# Patient Record
Sex: FEMALE | Race: WHITE
[De-identification: names, ages, dates, MRNs, and addresses within clinical notes are randomized per-mention and may not be internally consistent; named-entity substitution may affect disease eponyms.]

---

## 2020-10-10 ENCOUNTER — HOSPITAL ENCOUNTER (EMERGENCY)
Dept: HOSPITAL 62 - ER | Age: 45
Discharge: LEFT BEFORE DISCHARGE | End: 2020-10-10
Payer: COMMERCIAL

## 2020-10-10 VITALS — DIASTOLIC BLOOD PRESSURE: 75 MMHG | SYSTOLIC BLOOD PRESSURE: 105 MMHG

## 2020-10-10 DIAGNOSIS — R10.10: ICD-10-CM

## 2020-10-10 DIAGNOSIS — K80.20: Primary | ICD-10-CM

## 2020-10-10 DIAGNOSIS — E80.6: ICD-10-CM

## 2020-10-10 DIAGNOSIS — R11.2: ICD-10-CM

## 2020-10-10 DIAGNOSIS — R74.8: ICD-10-CM

## 2020-10-10 LAB
ADD MANUAL DIFF: NO
ADD MANUAL DIFF: NO
ALBUMIN SERPL-MCNC: 3.1 G/DL (ref 3.5–5)
ALBUMIN SERPL-MCNC: 4.3 G/DL (ref 3.5–5)
ALP SERPL-CCNC: 124 U/L (ref 38–126)
ALP SERPL-CCNC: 94 U/L (ref 38–126)
ANION GAP SERPL CALC-SCNC: 7 MMOL/L (ref 5–19)
ANION GAP SERPL CALC-SCNC: 9 MMOL/L (ref 5–19)
APPEARANCE UR: (no result)
APTT PPP: (no result) S
AST SERPL-CCNC: 166 U/L (ref 14–36)
AST SERPL-CCNC: 241 U/L (ref 14–36)
BASOPHILS # BLD AUTO: 0 10^3/UL (ref 0–0.2)
BASOPHILS # BLD AUTO: 0 10^3/UL (ref 0–0.2)
BASOPHILS NFR BLD AUTO: 0.2 % (ref 0–2)
BASOPHILS NFR BLD AUTO: 0.5 % (ref 0–2)
BILIRUB DIRECT SERPL-MCNC: 2.3 MG/DL (ref 0–0.4)
BILIRUB DIRECT SERPL-MCNC: 2.6 MG/DL (ref 0–0.4)
BILIRUB SERPL-MCNC: 3.3 MG/DL (ref 0.2–1.3)
BILIRUB SERPL-MCNC: 3.9 MG/DL (ref 0.2–1.3)
BILIRUB UR QL STRIP: (no result)
BUN SERPL-MCNC: 11 MG/DL (ref 7–20)
BUN SERPL-MCNC: 13 MG/DL (ref 7–20)
CALCIUM: 7.5 MG/DL (ref 8.4–10.2)
CALCIUM: 9.2 MG/DL (ref 8.4–10.2)
CHLORIDE SERPL-SCNC: 101 MMOL/L (ref 98–107)
CHLORIDE SERPL-SCNC: 109 MMOL/L (ref 98–107)
CO2 SERPL-SCNC: 23 MMOL/L (ref 22–30)
CO2 SERPL-SCNC: 31 MMOL/L (ref 22–30)
EOSINOPHIL # BLD AUTO: 0.2 10^3/UL (ref 0–0.6)
EOSINOPHIL # BLD AUTO: 0.3 10^3/UL (ref 0–0.6)
EOSINOPHIL NFR BLD AUTO: 3.4 % (ref 0–6)
EOSINOPHIL NFR BLD AUTO: 4.2 % (ref 0–6)
ERYTHROCYTE [DISTWIDTH] IN BLOOD BY AUTOMATED COUNT: 13.8 % (ref 11.5–14)
ERYTHROCYTE [DISTWIDTH] IN BLOOD BY AUTOMATED COUNT: 14 % (ref 11.5–14)
GLUCOSE SERPL-MCNC: 106 MG/DL (ref 75–110)
GLUCOSE SERPL-MCNC: 71 MG/DL (ref 75–110)
GLUCOSE UR STRIP-MCNC: NEGATIVE MG/DL
HCT VFR BLD CALC: 34.3 % (ref 36–47)
HCT VFR BLD CALC: 42.4 % (ref 36–47)
HGB BLD-MCNC: 11.9 G/DL (ref 12–15.5)
HGB BLD-MCNC: 14.4 G/DL (ref 12–15.5)
KETONES UR STRIP-MCNC: NEGATIVE MG/DL
LYMPHOCYTES # BLD AUTO: 1.1 10^3/UL (ref 0.5–4.7)
LYMPHOCYTES # BLD AUTO: 1.2 10^3/UL (ref 0.5–4.7)
LYMPHOCYTES NFR BLD AUTO: 12.9 % (ref 13–45)
LYMPHOCYTES NFR BLD AUTO: 20.1 % (ref 13–45)
MCH RBC QN AUTO: 29 PG (ref 27–33.4)
MCH RBC QN AUTO: 29.5 PG (ref 27–33.4)
MCHC RBC AUTO-ENTMCNC: 34 G/DL (ref 32–36)
MCHC RBC AUTO-ENTMCNC: 34.6 G/DL (ref 32–36)
MCV RBC AUTO: 85 FL (ref 80–97)
MCV RBC AUTO: 85 FL (ref 80–97)
MONOCYTES # BLD AUTO: 0.4 10^3/UL (ref 0.1–1.4)
MONOCYTES # BLD AUTO: 0.4 10^3/UL (ref 0.1–1.4)
MONOCYTES NFR BLD AUTO: 5.4 % (ref 3–13)
MONOCYTES NFR BLD AUTO: 6.7 % (ref 3–13)
NEUTROPHILS # BLD AUTO: 4.1 10^3/UL (ref 1.7–8.2)
NEUTROPHILS # BLD AUTO: 6.3 10^3/UL (ref 1.7–8.2)
NEUTS SEG NFR BLD AUTO: 69.6 % (ref 42–78)
NEUTS SEG NFR BLD AUTO: 77 % (ref 42–78)
NITRITE UR QL STRIP: NEGATIVE
PH UR STRIP: 6 [PH] (ref 5–9)
PLATELET # BLD: 271 10^3/UL (ref 150–450)
PLATELET # BLD: 407 10^3/UL (ref 150–450)
POTASSIUM SERPL-SCNC: 4 MMOL/L (ref 3.6–5)
POTASSIUM SERPL-SCNC: 4.2 MMOL/L (ref 3.6–5)
PROT SERPL-MCNC: 5.6 G/DL (ref 6.3–8.2)
PROT SERPL-MCNC: 7.1 G/DL (ref 6.3–8.2)
PROT UR STRIP-MCNC: 30 MG/DL
RBC # BLD AUTO: 4.04 10^6/UL (ref 3.72–5.28)
RBC # BLD AUTO: 4.97 10^6/UL (ref 3.72–5.28)
SP GR UR STRIP: 1.02
TOTAL CELLS COUNTED % (AUTO): 100 %
TOTAL CELLS COUNTED % (AUTO): 100 %
UROBILINOGEN UR-MCNC: 4 MG/DL (ref ?–2)
WBC # BLD AUTO: 5.9 10^3/UL (ref 4–10.5)
WBC # BLD AUTO: 8.2 10^3/UL (ref 4–10.5)

## 2020-10-10 PROCEDURE — 36415 COLL VENOUS BLD VENIPUNCTURE: CPT

## 2020-10-10 PROCEDURE — 81001 URINALYSIS AUTO W/SCOPE: CPT

## 2020-10-10 PROCEDURE — 96374 THER/PROPH/DIAG INJ IV PUSH: CPT

## 2020-10-10 PROCEDURE — 82270 OCCULT BLOOD FECES: CPT

## 2020-10-10 PROCEDURE — 93010 ELECTROCARDIOGRAM REPORT: CPT

## 2020-10-10 PROCEDURE — 85025 COMPLETE CBC W/AUTO DIFF WBC: CPT

## 2020-10-10 PROCEDURE — 89055 LEUKOCYTE ASSESSMENT FECAL: CPT

## 2020-10-10 PROCEDURE — 99285 EMERGENCY DEPT VISIT HI MDM: CPT

## 2020-10-10 PROCEDURE — 74177 CT ABD & PELVIS W/CONTRAST: CPT

## 2020-10-10 PROCEDURE — 76700 US EXAM ABDOM COMPLETE: CPT

## 2020-10-10 PROCEDURE — 87205 SMEAR GRAM STAIN: CPT

## 2020-10-10 PROCEDURE — 93005 ELECTROCARDIOGRAM TRACING: CPT

## 2020-10-10 PROCEDURE — 80074 ACUTE HEPATITIS PANEL: CPT

## 2020-10-10 PROCEDURE — 81025 URINE PREGNANCY TEST: CPT

## 2020-10-10 PROCEDURE — 87070 CULTURE OTHR SPECIMN AEROBIC: CPT

## 2020-10-10 PROCEDURE — 96361 HYDRATE IV INFUSION ADD-ON: CPT

## 2020-10-10 PROCEDURE — 80053 COMPREHEN METABOLIC PANEL: CPT

## 2020-10-10 PROCEDURE — 83690 ASSAY OF LIPASE: CPT

## 2020-10-10 PROCEDURE — 87045 FECES CULTURE AEROBIC BACT: CPT

## 2020-10-10 NOTE — ER DOCUMENT REPORT
ED General





- General


Stated Complaint: NAUSEA,VOMITING


Time Seen by Provider: 10/10/20 12:48





- HPI


Notes: 





45-year-old female with a history of 4 C-sections presents to the emergency room

today with complaints of intermittent abdominal pain and vomiting since March 

but has become progressively worse over the last 3 days.  She reports anytime 

she eats anything she starts vomiting, having abdominal pain. Since march, pt 

reports that she has had issues with nausea and vomiting, she does report that 

her pain is worse after eating.  Patient states that she is also having looser 

stool, denies any new medications foods or travel.  She is concerned that she 

may have appendicitis because her mother told her her symptoms sound like 

appendicitis reports   Patient has not tried any over-the-counter medications.  

Reports pain is 4 out of 5.  Denies any history of GI bleeds, she is unsure 

about melena.  Denies any fevers or chills.  Denies any history of GERD, for 

colonoscopy.  Patient has not tried any over-the-counter medications to help 

with her vomiting.





MEDICATIONS: I agree with the patient medications as charted by the RN.





ALLERGIES: I agree with the allergies as charted by the RN.





PAST MEDICAL HISTORY/PAST SURGICAL HISTORY: Reviewed and agree as charted by RN.





SOCIAL HISTORY: Reviewed and agree as charted by RN.





FAMILY HISTORY: No significant familial comorbid conditions directly related to 

patient complaint





EXAM:


Reviewed vital signs as charted by RN.





REVIEW OF SYSTEMS:reviewed vital signs by RN


CONSTITUTIONAL :  Denies fever,  chills, or sweats.  Denies recent illness.


EENT:   Denies eye, ear, throat, or mouth pain or symptoms.  Denies nasal or 

sinus congestion or discharge.  Denies throat, tongue, or mouth swelling or 

difficulty swallowing.


CARDIOVASCULAR:  Denies chest pain.  Denies palpitations or racing or irregular 

heart beat.  Denies ankle edema.


RESPIRATORY:  Denies cough, cold, or chest congestion.  Denies shortness of 

breath, difficulty breathing, or wheezing.


GASTROINTESTINAL: Reports upper abdominal pain. denies abdominal pain or 

distention.  reports nausea. denies vomiting, or diarrhea.  Denies blood in v

omitus, stools, or per rectum.  Denies black, tarry stools.  Denies 

constipation. 


GENITOURINARY:  Denies difficulty urinating, painful urination, burning, 

frequency, blood in urine, or discharge.


FEMALE  GENITOURINARY:  Denies vaginal bleeding, heavy or abnormal periods, 

irregular periods.  Denies vaginal discharge or odor. 


MUSCULOSKELETAL:  Denies back or neck pain or stiffness.  Denies joint pain or 

swelling.


SKIN:   Denies rash, lesions or sores.


HEMATOLOGIC :   Denies easy bruising or bleeding.


LYMPHATIC:  Denies swollen, enlarged glands.


NEUROLOGICAL:  Denies confusion or altered mental status.  Denies passing out or

loss of consciousness.  Denies dizziness or lightheadedness.  Denies headache.  

Denies weakness or paralysis or loss of use of either side.  Denies problems 

with gait or speech.  Denies sensory loss, numbness, or tingling.  Denies 

seizures.


PSYCHIATRIC:  Denies anxiety or stress.  Denies depression, suicidal ideation, 

or homicidal ideation.





ALL OTHER SYSTEMS REVIEWED AND NEGATIVE.











PHYSICAL EXAMINATION:





GENERAL: Well-appearing, well-nourished and in no acute distress.





HEAD: Atraumatic, normocephalic.





EYES: Pupils equal round and reactive to light, extraocular movements intact, 

conjunctiva are normal.





ENT: Nares patent, oropharynx clear without exudates.  Moist mucous membranes.





NECK: Normal range of motion, supple without lymphadenopathy





LUNGS: Breath sounds clear to auscultation bilaterally and equal.  No wheezes 

rales or rhonchi.





HEART: Regular rate and rhythm without murmurs





ABDOMEN: Soft, RUQ abd pain on palpation, epigastric a nondistended abdomen.  No

guarding, no rebound.  No masses appreciated. no cva tenderness appreciated. 





Female : deferred





Musculoskeletal: Normal range of motion, no pitting or edema.  No cyanosis.





NEUROLOGICAL: Cranial nerves grossly intact.  Normal speech, normal gait.  

Normal sensory, motor exams





PSYCH: Normal mood, normal affect.





SKIN: Warm, Dry, normal turgor, no rashes or lesions noted.

















Dictation was performed using Dragon voice recognition software





- Related Data


Allergies/Adverse Reactions: 


                                        





No Known Allergies Allergy (Unverified 10/10/20 12:44)


   











Past Medical History





- General


Information source: Patient





- Social History


Smoking Status: Unknown if Ever Smoked


Family History: Reviewed & Not Pertinent





Physical Exam





- Vital signs


Vitals: 


                                        











Temp Pulse Resp BP Pulse Ox


 


 98.4 F   80   16   113/58 L  100 


 


 10/10/20 12:12  10/10/20 12:12  10/10/20 12:12  10/10/20 12:12  10/10/20 12:12














Course





- Re-evaluation


Re-evalutation: 





10/10/20 18:50


Afebrile vitals stable no distress.  CBC negative for leukocytosis.  CMP shows 

no nephrotic issues, liver enzymes are well over 3 times the normal limit as 

well as her direct bilirubin being elevated and total bilirubin being elevated. 

Urinalysis does show bilirubin as well, no leukesterase.  Patient given IV 

fluids, Zofran to help manage her nausea and.  Ultrasound of abdomen does show 

multiple gallstones, largest measures 2 cm, normal wall thickness, no 

pericholecystic fluid, CT abdomen pelvis with IV and oral contrast shows that 

she does have a normal appendix, she does show to have gallstones, no 

inflammatory changes to suggest cholelithiasis however patient is having 

tenderness in her right upper quadrant and is symptomatic.  Patient refuses to 

stay because she has to go home to be with her daughter because she is home 

alone, her ex- is coming in the morning to take the child for custody per

the court order regulations.  She states she does not any family in the area 

nobody that can watch her child.  She states that she cannot stay even though I 

discussed with her explicitly the risk of possible temporary permanent 

disability or death that she does need to be evaluated by a surgeon for a 

possible cholecystectomy.  After performing a Medical Screening Examination, I 

spoke with the patient at length in regards to leaving the hospital against 

medical advice. I do not believe the patient should leave but the patient is 

alert oriented x4, understands the risks and benefits of staying and leaving 

including disability and death. Pt understands that she can return at any time 

for further care and is more than welcome to do so.  Patient was given Dr. Bro, surgeon on call, contact information and was counseled to return 

immediately if any of her symptoms change she can bring her child if still 

needed. pt verbalizes this understanding.





- Vital Signs


Vital signs: 


                                        











Temp Pulse Resp BP Pulse Ox


 


 98.7 F   92   16   105/75   100 


 


 10/10/20 18:21  10/10/20 18:21  10/10/20 18:21  10/10/20 18:21  10/10/20 18:21














- Laboratory


Result Diagrams: 


                                 10/10/20 16:12





                                 10/10/20 16:12


Laboratory results interpreted by me: 


                                        











  10/10/20 10/10/20 10/10/20





  12:18 12:18 12:18


 


Hgb   


 


Hct   


 


Lymph % (Auto)  12.9 L  


 


Chloride   


 


Carbon Dioxide   31 H 


 


Glucose   


 


Calcium   


 


Total Bilirubin   3.9 H 


 


Direct Bilirubin   2.6 H 


 


AST   241 H 


 


ALT   592 H 


 


Total Protein   


 


Albumin   


 


Urine Protein    30 H


 


Urine Bilirubin    MODERATE H


 


Urine Urobilinogen    4.0 H














  10/10/20 10/10/20





  16:12 16:12


 


Hgb  11.9 L D 


 


Hct  34.3 L 


 


Lymph % (Auto)  


 


Chloride   109 H


 


Carbon Dioxide  


 


Glucose   71 L


 


Calcium   7.5 L


 


Total Bilirubin   3.3 H


 


Direct Bilirubin   2.3 H


 


AST   166 H


 


ALT   419 H


 


Total Protein   5.6 L


 


Albumin   3.1 L


 


Urine Protein  


 


Urine Bilirubin  


 


Urine Urobilinogen  














Discharge





- Discharge


Clinical Impression: 


 Liver enzyme elevation, Cholelithiasis, Elevated bilirubin





Condition: Stable


Disposition: AGAINST MEDICAL ADVICE


Referrals: 


CARMELINA BRO MD [ACTIVE STAFF] - Follow up tomorrow

## 2020-10-10 NOTE — RADIOLOGY REPORT (SQ)
EXAM DESCRIPTION:  U/S ABDOMEN COMPLETE W/O DOP



IMAGES COMPLETED DATE/TIME:  10/10/2020 4:52 pm



REASON FOR STUDY:  RUQ abd pain



COMPARISON:  None.



TECHNIQUE:  Dynamic and static grayscale images acquired of the abdomen and recorded on PACS. Additio
nal selected color Doppler and spectral images recorded.

Note:  Study does not meet criteria for complete doppler/duplex scan



LIMITATIONS:  None.



FINDINGS:  PANCREAS: No masses. Visualized pancreatic duct normal caliber.

LIVER: No masses. Echotexture normal.

LIVER VASCULATURE: Normal directional flow of the main portal vein and hepatic veins.

GALLBLADDER: Multiple gallstones, largest measures 2 cm.  Normal wall thickness. No pericholecystic f
luid.

ULTRASOUND-DETECTED ROBLES'S SIGN: Negative.

INTRAHEPATIC DUCTS AND COMMON DUCT: CBD and intrahepatic ducts normal caliber. No filling defects.

INFERIOR VENA CAVA: Normal flow.

AORTA: No aneurysm.

RIGHT KIDNEY:Normal size. Normal echogenicity. No solid or suspicious masses. No hydronephrosis. No c
alcifications.

LEFT KIDNEY:  Normal size. Normal echogenicity. No solid or suspicious masses. No hydronephrosis. No 
calcifications.

SPLEEN: Normal size. No solid masses.

PERITONEAL AND PLEURAL SPACES: No ascites or effusions.

OTHER: No other significant finding.



IMPRESSION:  Multiple gallstones, largest measures 2 cm. Normal wall thickness. No pericholecystic fl
uid.



TECHNICAL DOCUMENTATION:  JOB ID:  [id number]

[id number]

 2011 [employer]- All Rights Reserved



Reading location - IP/workstation name: [employer]

## 2020-10-11 ENCOUNTER — HOSPITAL ENCOUNTER (OUTPATIENT)
Dept: HOSPITAL 62 - ER | Age: 45
Setting detail: OBSERVATION
LOS: 1 days | Discharge: HOME | End: 2020-10-12
Payer: COMMERCIAL

## 2020-10-11 DIAGNOSIS — Z20.828: ICD-10-CM

## 2020-10-11 DIAGNOSIS — Z87.891: ICD-10-CM

## 2020-10-11 DIAGNOSIS — R63.0: ICD-10-CM

## 2020-10-11 DIAGNOSIS — K80.00: Primary | ICD-10-CM

## 2020-10-11 DIAGNOSIS — R11.0: ICD-10-CM

## 2020-10-11 DIAGNOSIS — R10.11: ICD-10-CM

## 2020-10-11 DIAGNOSIS — R94.5: ICD-10-CM

## 2020-10-11 LAB
ADD MANUAL DIFF: NO
ALBUMIN SERPL-MCNC: 4 G/DL (ref 3.5–5)
ALP SERPL-CCNC: 113 U/L (ref 38–126)
ANION GAP SERPL CALC-SCNC: 8 MMOL/L (ref 5–19)
APPEARANCE UR: (no result)
APTT PPP: (no result) S
AST SERPL-CCNC: 140 U/L (ref 14–36)
BASOPHILS # BLD AUTO: 0 10^3/UL (ref 0–0.2)
BASOPHILS NFR BLD AUTO: 0.6 % (ref 0–2)
BILIRUB DIRECT SERPL-MCNC: 0.9 MG/DL (ref 0–0.4)
BILIRUB SERPL-MCNC: 2 MG/DL (ref 0.2–1.3)
BILIRUB UR QL STRIP: NEGATIVE
BUN SERPL-MCNC: 8 MG/DL (ref 7–20)
CALCIUM: 9 MG/DL (ref 8.4–10.2)
CHLORIDE SERPL-SCNC: 103 MMOL/L (ref 98–107)
CO2 SERPL-SCNC: 26 MMOL/L (ref 22–30)
EOSINOPHIL # BLD AUTO: 0.3 10^3/UL (ref 0–0.6)
EOSINOPHIL NFR BLD AUTO: 4.8 % (ref 0–6)
ERYTHROCYTE [DISTWIDTH] IN BLOOD BY AUTOMATED COUNT: 14 % (ref 11.5–14)
GLUCOSE SERPL-MCNC: 81 MG/DL (ref 75–110)
GLUCOSE UR STRIP-MCNC: NEGATIVE MG/DL
HCT VFR BLD CALC: 36.7 % (ref 36–47)
HGB BLD-MCNC: 12.3 G/DL (ref 12–15.5)
KETONES UR STRIP-MCNC: 20 MG/DL
LYMPHOCYTES # BLD AUTO: 1.2 10^3/UL (ref 0.5–4.7)
LYMPHOCYTES NFR BLD AUTO: 22.6 % (ref 13–45)
MCH RBC QN AUTO: 28.6 PG (ref 27–33.4)
MCHC RBC AUTO-ENTMCNC: 33.6 G/DL (ref 32–36)
MCV RBC AUTO: 85 FL (ref 80–97)
MONOCYTES # BLD AUTO: 0.4 10^3/UL (ref 0.1–1.4)
MONOCYTES NFR BLD AUTO: 8.1 % (ref 3–13)
NEUTROPHILS # BLD AUTO: 3.3 10^3/UL (ref 1.7–8.2)
NEUTS SEG NFR BLD AUTO: 63.9 % (ref 42–78)
PH UR STRIP: 5 [PH] (ref 5–9)
PLATELET # BLD: 320 10^3/UL (ref 150–450)
POTASSIUM SERPL-SCNC: 4.1 MMOL/L (ref 3.6–5)
PROT SERPL-MCNC: 6.7 G/DL (ref 6.3–8.2)
PROT UR STRIP-MCNC: NEGATIVE MG/DL
RBC # BLD AUTO: 4.32 10^6/UL (ref 3.72–5.28)
SP GR UR STRIP: 1.02
TOTAL CELLS COUNTED % (AUTO): 100 %
UROBILINOGEN UR-MCNC: NEGATIVE MG/DL (ref ?–2)
WBC # BLD AUTO: 5.2 10^3/UL (ref 4–10.5)

## 2020-10-11 PROCEDURE — 0FT44ZZ RESECTION OF GALLBLADDER, PERCUTANEOUS ENDOSCOPIC APPROACH: ICD-10-PCS | Performed by: SURGERY

## 2020-10-11 PROCEDURE — C9803 HOPD COVID-19 SPEC COLLECT: HCPCS

## 2020-10-11 PROCEDURE — 99285 EMERGENCY DEPT VISIT HI MDM: CPT

## 2020-10-11 PROCEDURE — 85025 COMPLETE CBC W/AUTO DIFF WBC: CPT

## 2020-10-11 PROCEDURE — 96360 HYDRATION IV INFUSION INIT: CPT

## 2020-10-11 PROCEDURE — 47563 LAPARO CHOLECYSTECTOMY/GRAPH: CPT

## 2020-10-11 PROCEDURE — G0378 HOSPITAL OBSERVATION PER HR: HCPCS

## 2020-10-11 PROCEDURE — 96361 HYDRATE IV INFUSION ADD-ON: CPT

## 2020-10-11 PROCEDURE — 81025 URINE PREGNANCY TEST: CPT

## 2020-10-11 PROCEDURE — 87635 SARS-COV-2 COVID-19 AMP PRB: CPT

## 2020-10-11 PROCEDURE — 80053 COMPREHEN METABOLIC PANEL: CPT

## 2020-10-11 PROCEDURE — 00790 ANES IPER UPR ABD NOS: CPT

## 2020-10-11 PROCEDURE — 74300 X-RAY BILE DUCTS/PANCREAS: CPT

## 2020-10-11 PROCEDURE — 88304 TISSUE EXAM BY PATHOLOGIST: CPT

## 2020-10-11 PROCEDURE — 36415 COLL VENOUS BLD VENIPUNCTURE: CPT

## 2020-10-11 PROCEDURE — 81001 URINALYSIS AUTO W/SCOPE: CPT

## 2020-10-11 RX ADMIN — Medication SCH ML: at 21:10

## 2020-10-11 RX ADMIN — DOCUSATE SODIUM SCH MG: 100 CAPSULE, LIQUID FILLED ORAL at 18:17

## 2020-10-11 RX ADMIN — KETOROLAC TROMETHAMINE PRN MG: 30 INJECTION, SOLUTION INTRAMUSCULAR at 21:13

## 2020-10-11 RX ADMIN — OXYCODONE AND ACETAMINOPHEN PRN TAB: 5; 325 TABLET ORAL at 14:29

## 2020-10-11 RX ADMIN — OXYCODONE AND ACETAMINOPHEN PRN TAB: 5; 325 TABLET ORAL at 18:29

## 2020-10-11 NOTE — RADIOLOGY REPORT (SQ)
EXAM DESCRIPTION:  NO CHG FLUORO; CHOLANGIOGRAM OPERATIVE



IMAGES COMPLETED DATE/TIME:  10/11/2020 1:35 pm



REASON FOR STUDY:  LAP KEITH



COMPARISON:  None.



FLUOROSCOPY TIME:  0.1 minutes

8 images saved to PACS.



TECHNIQUE:  Cinegraphic images were obtained from an intraoperative cholangiogram.



LIMITATIONS:  None.



FINDINGS:  There is opacification of the bile ducts, cystic duct remnants and second portion of the d
uodenum without evidence of fixed filling defect or significant extravasation.



IMPRESSION:  INTRAOPERATIVE CHOLANGIOGRAM.



COMMENT:  Quality :  Final reports for procedures using fluoroscopy that document radiation exp
osure indices, or exposure time and number of fluorographic images (if radiation exposure indices are
 not available)



TECHNICAL DOCUMENTATION:  JOB ID:  [id number]

 2011 [employer]- All Rights Reserved



Reading location - IP/workstation name: [email or web address]

## 2020-10-11 NOTE — RADIOLOGY REPORT (SQ)
EXAM DESCRIPTION:  NO CHG FLUORO; CHOLANGIOGRAM OPERATIVE



IMAGES COMPLETED DATE/TIME:  10/11/2020 1:35 pm



REASON FOR STUDY:  LAP KEITH



COMPARISON:  None.



FLUOROSCOPY TIME:  0.1 minutes

8 images saved to PACS.



TECHNIQUE:  Cinegraphic images were obtained from an intraoperative cholangiogram.



LIMITATIONS:  None.



FINDINGS:  There is opacification of the bile ducts, cystic duct remnants and second portion of the d
uodenum without evidence of fixed filling defect or significant extravasation.



IMPRESSION:  INTRAOPERATIVE CHOLANGIOGRAM.



COMMENT:  Quality :  Final reports for procedures using fluoroscopy that document radiation exp
osure indices, or exposure time and number of fluorographic images (if radiation exposure indices are
 not available)



TECHNICAL DOCUMENTATION:  JOB ID:  [id number]

 2011 [employer]- All Rights Reserved



Reading location - IP/workstation name: [email or web address]

## 2020-10-11 NOTE — ER DOCUMENT REPORT
ED GI/





- General


Chief Complaint: Abdominal Pain


Stated Complaint: ABDOMINAL PAIN


Time Seen by Provider: 10/11/20 09:34


Mode of Arrival: Ambulatory


Information source: Patient


Notes: 





45-year-old woman presenting to the emergency department history of 

cholelithiasis with right upper quadrant pain which is been ongoing for the past

3 to 4 days.  She also has a history of intermittent episodes of abdominal pain 

with vomiting which began back in March of this year.  She has a past medical 

history of uterine fibroids, otherwise no significant medical problems.  She was

seen in the emergency department yesterday apparently because of elevated liver 

enzymes and elevated total bilirubin with Jane sign and multiple gallstones 

told she would need to have surgery.  The patient signed out AMA.  She states 

that she has children at home and she is a single mother, she is also involved 

in a custody dispute.  She returns today stating that she has made arrangements 

for her children and that she is now willing to come into the hospital for the 

procedure.  She states she had continued episodes of pain last night, unable to 

eat because of increased pain and nausea and vomiting.





- Related Data


Allergies/Adverse Reactions: 


                                        





No Known Allergies Allergy (Unverified 10/10/20 12:44)


   











Past Medical History





- Social History


Smoking Status: Unknown if Ever Smoked


Family History: Reviewed & Not Pertinent





Review of Systems





- Review of Systems


Notes: 





Constitutional: Negative for fever.


HENT: Negative for sore throat.


Eyes: Negative for visual changes.


Cardiovascular: Negative for chest pain.


Respiratory: Negative for shortness of breath.


Gastrointestinal: + Right upper quadrant tenderness


Genitourinary: Negative for dysuria.


Musculoskeletal: Negative for back pain.


Skin: Negative for rash.


Neurological: Negative for headaches, weakness or numbness.





10 point ROS negative except as marked above and in HPI.





Physical Exam





- Vital signs


Vitals: 


                                        











Temp Pulse Resp BP Pulse Ox


 


 98.6 F   78   20   109/46 L  100 


 


 10/11/20 09:11  10/11/20 09:11  10/11/20 09:11  10/11/20 09:11  10/11/20 09:11














- Notes


Notes: 





PHYSICAL EXAMINATION:


 


Physical Exam:


General: Well-nourished well-developed 45-year-old woman in no acute distress


HEENT: NC/AT, pupils equal round and reactive to light, MM moist,nares clear, 

oropharynx clear, airway patent


Neck: supple, no adenopathy, no masses.  Good range of motion


Lungs: clear, no wheezing, no rales no rhonchi


CVS: Regular rate and rhythm no murmur gallop or rub


Abdomen: Soft, active, tenderness in the right upper quadrant, + Jane sign, no

masses, no hepatosplenomegaly


Ext:   No edema, clubbing or cyanosis.


Neuro: Alert and responsive, moving all 4 extremities on command, cranial nerves

intact, no focal findings


Skin: Intact no open lesions, no rash











Course





- Re-evaluation


Re-evalutation: 





10/11/20 10:01


I discussed the patient with the general surgeon on-call Dr. Bro, the 

patient has not eaten since 8 PM last night, he has been made n.p.o., a rapid 

coronavirus testing is requested.  Dr. Bro will accept the patient for 

admission and treatment.





- Vital Signs


Vital signs: 


                                        











Temp Pulse Resp BP Pulse Ox


 


 98.6 F   78   20   109/46 L  100 


 


 10/11/20 09:11  10/11/20 09:11  10/11/20 09:11  10/11/20 09:11  10/11/20 09:11














Discharge





- Discharge


Clinical Impression: 


 Cholelithiasis, Liver enzyme elevation, Elevated bilirubin





Condition: Good


Disposition: ADMITTED AS INPATIENT


Admitting Provider: Surgicalist - Dr. Bro


Unit Admitted: Surgical Floor

## 2020-10-11 NOTE — OPERATIVE REPORT
Operative Report


DATE OF SURGERY: 10/11/20


PREOPERATIVE DIAGNOSIS: 1.  Acute cholecystitis with cholelithiasis.  2.  [place]

jewel liver function studies


POSTOPERATIVE DIAGNOSIS: Same with no evidence of common bile duct obstruction, 

or common bile duct stone


OPERATION: 1.  Laparoscopic cholecystectomy.  2.  Intraoperative 

cholangiography.  3.  Interpretation of intraoperative cholangiography


SURGEON: CARMELINA STONE


ANESTHESIA: GA


TISSUE REMOVED OR ALTERED: 1 gallbladder with stones


COMPLICATIONS: 





None


ESTIMATED BLOOD LOSS: Scant


INTRAOPERATIVE FINDINGS: See below


PROCEDURE: 





The patient was taken to the preop holding her to the main operating room where 

general anesthesia was induced.  Arms were abducted, abdomen prepped and draped 

in sterile fashion.





Surgical plan and surgical timeout were conducted.





Markings were made on the skin for for port laparoscopy.  All 4 sites were 

anesthetized with 1% plain lidocaine.  A vertical incision was made over the 

umbilicus at the site of previous scar.  Knife used to incise the underlying 

subcutaneous tissue and anterior fascia.  The Veress needle was inserted to 

peritoneal cavity, pneumoperitoneum established, Veress needle removed, 5 mm 

port inserted and a 5 mm viewing scope was inserted.  There was no evidence of 

bowel or vascular injury.





Under direct visualization 3 additional ports were placed all under direct 

visualization to the subxiphoid, subcostal positions.





The gallbladder was moderately distended.  It also had some edema, but was not 

markedly swollen, tight or erythematous.  Graspers were placed in the 

gallbladder and infundibulum.  Using hook cautery dissection, the neck of the 

gallbladder was dissected out.  The anatomy here was classic.  Photos were taken

during the dissection.  The cystic artery and cystic vein were in their usual 

location.  The triangle of Calot was opened widely.  The cystic artery was peña

rrounded with dissector, photographed, clipped twice proximally once distally 

divided with scissors.  The triangle of Calot was opened widely.  Photographs 

taken.  A clip was placed on the gallbladder side of the cystic duct, and an 

opening made in the cystic duct with a laparoscopic scissors.  We brought onto 

the field a disposable cholangiogram catheter inserted through the intra-

abdominal wall using a fan blade.  The inner cannula  was removed, and 

irrigation and cholangiogram equipment attached to the cholangiogram catheter.  

We now introduced the tip of the catheter into the cystic duct, and secured into

position with a clip applicator.





Patient was leveled out, all instruments removed, and approximately 8 cc of full

contrast Isovue was injected into the cholangiogram catheter.  This revealed 

opacification of the distal common hepatic duct, the entire common bile duct 

which was enlarged, however it tapered as it went distally towards the duodenum.

 There was immediate egress of contrast into the duodenum.  Multiple photos were

taken.  Was no obvious filling defect in the common bile duct; the proximal 

biliary tree was not visualized in its entirety despite manipulating the patient

into Trendelenburg position.  There was no evidence of leak.  In summary the 

cholangiogram, though limited due to incomplete opacification of the proximal  

biliary tree, demonstrated no bile leak, no retained stone in the common bile 

duct, and rapid contrast into the duodenum.  





We returned to the field laparoscopically remove the cholangiogram catheter, and

took the gallbladder off of the liver bed using hook cautery dissection.  We 

secured the cystic duct proximally with a clip and a 0 PDS loop.  There was no 

evidence of bile leak.





The gallbladder was removed from the patient after opening up the fascial defect

at the supraumbilical port site.  The specimen had multiple stones.  It was sent

to pathology





We returned the peritoneal cavity check for bleeding there was none.  Clips on 

the cystic Artery Stump, Cystic Duct Stump, and Endoloop on the cystic duct 

stump all visualized, felt to be secure, photographed and no evidence of bile 

leaking.





We felt the operation was complete.  Sponge and needle counts are correct.  All 

ports removed under direct visualization, pneumoperitoneum evacuated, and wounds

closed with 0 Vicryl at the fascial level above the umbilicus, the skin level 

with 3-0 Vicryl suture.





Benzoin Steri-Strips applied.  Patient tolerated the procedure well, extubated, 

taken to the recovery room in stable condition.

## 2020-10-12 VITALS — DIASTOLIC BLOOD PRESSURE: 68 MMHG | SYSTOLIC BLOOD PRESSURE: 106 MMHG

## 2020-10-12 LAB — HEPATITIS C VIRUS ANTIBODY: <0.1 S/CO RATIO (ref 0–0.9)

## 2020-10-12 RX ADMIN — KETOROLAC TROMETHAMINE PRN MG: 30 INJECTION, SOLUTION INTRAMUSCULAR at 09:23

## 2020-10-12 RX ADMIN — KETOROLAC TROMETHAMINE PRN MG: 30 INJECTION, SOLUTION INTRAMUSCULAR at 03:43

## 2020-10-12 RX ADMIN — Medication SCH ML: at 15:16

## 2020-10-12 RX ADMIN — KETOROLAC TROMETHAMINE PRN MG: 30 INJECTION, SOLUTION INTRAMUSCULAR at 15:13

## 2020-10-12 RX ADMIN — Medication SCH ML: at 05:33

## 2020-10-12 RX ADMIN — DOCUSATE SODIUM SCH MG: 100 CAPSULE, LIQUID FILLED ORAL at 09:23

## 2020-10-12 NOTE — PDOC DISCHARGE SUMMARY
General





- Admit/Disc Date/PCP


Admission Date/Primary Care Provider: 


  10/11/20 10:18





  





Discharge Date: 10/12/20





- Discharge Diagnosis


Final Diagnosis: 





Symptomatic cholelithiasis with cholecystitis; elevated liver function studies





- Assessment


Summary: 


45-year-old white female with recurrent visits to the emergency department for 

abdominal pain, anorexia, right upper quadrant tenderness, and gallbladder 

ultrasonography demonstrating gallstones with elevated liver function studies.  

Her preoperative total bilirubin was 2.0 patient was admitted to the surgical 

service, kept n.p.o., had a COVID test which was negative, was taken to the 

operating room and underwent laparoscopic cholecystectomy with intraoperative 

cholangiography.  She was found to have a tapered distal common bile duct but no

evidence of retained stones, E flux into the duodenum, and no extravasation.  

She tolerated procedure well, did have some pain out of proportion to physical 

findings postoperatively, but does settle down with the Toradol.





Of the following morning she was doing well, tolerating diet, voiding, had 

stable vital signs and was ready for discharge home.





Patient will be discharged home to care of her family, follow-up with Dr. Bro at [facility] in 1 to 2 weeks, take Tylenol or Motrin as 

needed pain, shower call the office if any problems develop in the interim.





- Additional Information


Resuscitation Status: Full Code


Discharge Diet: As Tolerated


Discharge Activity: Activity As Tolerated - Patient may shower; take Motrin or 

Tylenol as needed pain; follow-up with [facility] in 1 to 2 weeks.


Home Medications: 








No Home Medications  10/11/20 











History of Present Illiness


History of Present Illness: 


SAMM EM is a 45 year old female


Presents emergency department for the second time in 24 hours complaining of 

abdominal pain nausea, anorexia.  She was evaluated yesterday, found to have 

right upper quadrant tenderness, ultrasound and CT scan findings consistent with

cholecystitis with cholelithiasis and elevated liver function studies.  Advised 

admission and surgical intervention, however left AMA in order to take care of 

her children related to a domestic dispute.  She returns this morning with the 

above complaints.  She is hemodynamically stable has no evidence of sepsis.  

Last bowel movement yesterday loose.  Last meal last night, n.p.o. since.  

Patient emergency department without pain.  Surgery consulted, patient advised 

admission and definitive management.





Physical Exam


Vital Signs: 


                                        











Temp Pulse Resp BP Pulse Ox


 


 97.7 F   57 L  16   106/70   98 


 


 10/12/20 03:51  10/12/20 03:51  10/12/20 03:51  10/12/20 03:51  10/12/20 03:51








                                 Intake & Output











 10/11/20 10/12/20 10/13/20





 06:59 06:59 06:59


 


Intake Total  2540 


 


Output Total  555 


 


Balance  1985 


 


Weight  65.3 kg 














Results


Laboratory Results: 


                                        











WBC  5.2 10^3/uL (4.0-10.5)   10/11/20  10:10    


 


RBC  4.32 10^6/uL (3.72-5.28)   10/11/20  10:10    


 


Hgb  12.3 g/dL (12.0-15.5)   10/11/20  10:10    


 


Hct  36.7 % (36.0-47.0)   10/11/20  10:10    


 


MCV  85 fl (80-97)   10/11/20  10:10    


 


MCH  28.6 pg (27.0-33.4)   10/11/20  10:10    


 


MCHC  33.6 g/dL (32.0-36.0)   10/11/20  10:10    


 


RDW  14.0 % (11.5-14.0)   10/11/20  10:10    


 


Plt Count  320 10^3/uL (150-450)   10/11/20  10:10    


 


Lymph % (Auto)  22.6 % (13-45)   10/11/20  10:10    


 


Mono % (Auto)  8.1 % (3-13)   10/11/20  10:10    


 


Eos % (Auto)  4.8 % (0-6)   10/11/20  10:10    


 


Baso % (Auto)  0.6 % (0-2)   10/11/20  10:10    


 


Absolute Neuts (auto)  3.3 10^3/uL (1.7-8.2)   10/11/20  10:10    


 


Absolute Lymphs (auto)  1.2 10^3/uL (0.5-4.7)   10/11/20  10:10    


 


Absolute Monos (auto)  0.4 10^3/uL (0.1-1.4)   10/11/20  10:10    


 


Absolute Eos (auto)  0.3 10^3/uL (0.0-0.6)   10/11/20  10:10    


 


Absolute Basos (auto)  0.0 10^3/uL (0.0-0.2)   10/11/20  10:10    


 


Seg Neutrophils %  63.9 % (42-78)   10/11/20  10:10    


 


Sodium  137.0 mmol/L (137-145)   10/11/20  10:10    


 


Potassium  4.1 mmol/L (3.6-5.0)   10/11/20  10:10    


 


Chloride  103 mmol/L ()   10/11/20  10:10    


 


Carbon Dioxide  26 mmol/L (22-30)   10/11/20  10:10    


 


Anion Gap  8  (5-19)   10/11/20  10:10    


 


BUN  8 mg/dL (7-20)   10/11/20  10:10    


 


Creatinine  0.73 mg/dL (0.52-1.25)   10/11/20  10:10    


 


Est GFR ( Amer)  > 60  (>60)   10/11/20  10:10    


 


Est GFR (MDRD) Non-Af  > 60  (>60)   10/11/20  10:10    


 


Glucose  81 mg/dL ()   10/11/20  10:10    


 


Calcium  9.0 mg/dL (8.4-10.2)   10/11/20  10:10    


 


Total Bilirubin  2.0 mg/dL (0.2-1.3)  H  10/11/20  10:10    


 


Direct Bilirubin  0.9 mg/dL (0.0-0.4)  H  10/11/20  10:10    


 


Neonat Total Bilirubin  Not Reportable   10/11/20  10:10    


 


Neonat Direct Bilirubin  Not Reportable   10/11/20  10:10    


 


Neonat Indirect Bili  Not Reportable   10/11/20  10:10    


 


AST  140 U/L (14-36)  H  10/11/20  10:10    


 


ALT  413 U/L (<35)  H  10/11/20  10:10    


 


Alkaline Phosphatase  113 U/L ()   10/11/20  10:10    


 


Total Protein  6.7 g/dL (6.3-8.2)   10/11/20  10:10    


 


Albumin  4.0 g/dL (3.5-5.0)   10/11/20  10:10    


 


Urine Color  LIO   10/11/20  10:30    


 


Urine Appearance  SLIGHTLY-CLOUDY   10/11/20  10:30    


 


Urine pH  5.0  (5.0-9.0)   10/11/20  10:30    


 


Ur Specific Gravity  1.019   10/11/20  10:30    


 


Urine Protein  NEGATIVE mg/dL (NEGATIVE)   10/11/20  10:30    


 


Urine Glucose (UA)  NEGATIVE mg/dL (NEGATIVE)   10/11/20  10:30    


 


Urine Ketones  20 mg/dL (NEGATIVE)  H  10/11/20  10:30    


 


Urine Blood  NEGATIVE  (NEGATIVE)   10/11/20  10:30    


 


Urine Nitrite (Reflex)  NEGATIVE  (NEGATIVE)   10/11/20  10:30    


 


Urine Bilirubin  NEGATIVE  (NEGATIVE)   10/11/20  10:30    


 


Urine Urobilinogen  NEGATIVE mg/dL (<2.0)   10/11/20  10:30    


 


Leukocyte Esterase Rfl  NEGATIVE  (NEGATIVE)   10/11/20  10:30    


 


Urine RBC (Auto)  2 /HPF  10/11/20  10:30    


 


Urine WBC (Reflex)  1 /HPF  10/11/20  10:30    


 


Squamous Epi Cells Auto  7 /HPF  10/11/20  10:30    


 


Urine Mucus (Auto)  OCC /LPF  10/11/20  10:30    


 


Urine Ascorbic Acid  NEGATIVE  (NEGATIVE)   10/11/20  10:30    


 


Urine HCG, Qual  NEGATIVE  (NEGATIVE)   10/11/20  10:30    


 


SARS-CoV-2 (PCR)  NEGATIVE  (NEGATIVE)   10/11/20  10:10    











Impressions: 


                                        





Cholangiogram  10/11/20 11:00


IMPRESSION:  INTRAOPERATIVE CHOLANGIOGRAM.


 








Fluoroscopy  10/11/20 11:00


IMPRESSION:  INTRAOPERATIVE CHOLANGIOGRAM.

## 2022-11-12 NOTE — RADIOLOGY REPORT (SQ)
EXAM DESCRIPTION:  CT ABD/PELVIS WITH IV   ORAL



IMAGES COMPLETED DATE/TIME:  10/10/2020 6:17 pm



REASON FOR STUDY:  bilateral abd pain, +nausea



COMPARISON:  None.



TECHNIQUE:  CT scan of the abdomen and pelvis performed using helical scanning technique with dynamic
 intravenous contrast injection.  No oral contrast. Images reviewed with lung, soft tissue, and bone 
windows. Reconstructed coronal and sagittal MPR images reviewed. Delayed images for evaluation of the
 urinary system also acquired. All images stored on PACS.

All CT scanners at this facility use dose modulation, iterative reconstruction, and/or weight based d
osing when appropriate to reduce radiation dose to as low as reasonably achievable (ALARA).

[facility]: Dose Right  [facility]: CareDose    [facility]: Dose Right    [facility]: Teradose 4D    [facility]: Smart Technologies



CONTRAST TYPE AND DOSE:  contrast/concentration: Isovue 350.00 mmol/ml; Total Contrast Delivered: 62.
0 ml; Total Saline Delivered: 65.0 ml



RENAL FUNCTION:  GFR > 60.



RADIATION DOSE:  CT Rad equipment meets quality standard of care and radiation dose reduction techniq
ues were employed. CTDIvol: 4.8 - 5.4 mGy. DLP: 485 mGy-cm..



LIMITATIONS:  None.



FINDINGS:  LOWER CHEST: No significant findings. No nodules or infiltrates.

LIVER: Normal size. No masses.  No dilated ducts.

SPLEEN: Normal size. No focal lesions.

PANCREAS: No masses. No significant calcifications. No adjacent inflammation or peripancreatic fluid 
collections. Pancreatic duct not dilated.

GALLBLADDER: Gallstones. No inflammatory changes to suggest cholecystitis.

ADRENAL GLANDS: No significant masses or asymmetry.

RIGHT KIDNEY AND URETER: No solid masses.   No significant calcifications.   No hydronephrosis or hyd
roureter.

LEFT KIDNEY AND URETER: No solid masses.   No significant calcifications.   No hydronephrosis or hydr
oureter.

AORTA AND VESSELS: No aneurysm. No dissection. Renal arteries, SMA, celiac without stenosis.

RETROPERITONEUM: No retroperitoneal adenopathy, hemorrhage or masses.

BOWEL AND PERITONEAL CAVITY: No masses or inflammatory changes. No free fluid or peritoneal masses.

APPENDIX: Normal.

PELVIS: Enlarged fibroid uterus.

ABDOMINAL WALL: No masses. No hernias.

BONES: No significant or acute findings.

OTHER: No other significant finding.



IMPRESSION:

1. Cholelithiasis.

2. Uterine fibroids.



TECHNICAL DOCUMENTATION:  JOB ID:  [id number]

Quality ID # 436: Final reports with documentation of one or more dose reduction techniques (e.g., Au
tomated exposure control, adjustment of the mA and/or kV according to patient size, use of iterative 
reconstruction technique)

 2011 [employer]- All Rights Reserved



Reading location - IP/workstation name: [email or web address] no abdominal distension/no blood in stool/no burning urination